# Patient Record
Sex: FEMALE | Race: BLACK OR AFRICAN AMERICAN | Employment: OTHER | URBAN - METROPOLITAN AREA
[De-identification: names, ages, dates, MRNs, and addresses within clinical notes are randomized per-mention and may not be internally consistent; named-entity substitution may affect disease eponyms.]

---

## 2017-02-07 ENCOUNTER — TELEPHONE (OUTPATIENT)
Dept: SURGERY | Age: 52
End: 2017-02-07

## 2017-03-16 ENCOUNTER — OFFICE VISIT (OUTPATIENT)
Dept: PULMONOLOGY | Age: 52
End: 2017-03-16

## 2017-03-16 VITALS
BODY MASS INDEX: 45.99 KG/M2 | SYSTOLIC BLOOD PRESSURE: 110 MMHG | HEIGHT: 67 IN | TEMPERATURE: 97.6 F | RESPIRATION RATE: 20 BRPM | WEIGHT: 293 LBS | OXYGEN SATURATION: 96 % | HEART RATE: 76 BPM | DIASTOLIC BLOOD PRESSURE: 60 MMHG

## 2017-03-16 DIAGNOSIS — Z91.09 ENVIRONMENTAL ALLERGIES: ICD-10-CM

## 2017-03-16 DIAGNOSIS — J45.40 MODERATE PERSISTENT ASTHMA WITHOUT COMPLICATION: Primary | ICD-10-CM

## 2017-03-16 PROCEDURE — 99213 OFFICE O/P EST LOW 20 MIN: CPT | Performed by: INTERNAL MEDICINE

## 2017-03-16 RX ORDER — FLUTICASONE FUROATE AND VILANTEROL 200; 25 UG/1; UG/1
200 POWDER RESPIRATORY (INHALATION) DAILY
Qty: 1 EACH | Refills: 5 | Status: SHIPPED | OUTPATIENT
Start: 2017-03-16 | End: 2018-03-22 | Stop reason: SDUPTHER

## 2017-03-16 ASSESSMENT — ASTHMA QUESTIONNAIRES
QUESTION_5 LAST FOUR WEEKS HOW WOULD YOU RATE YOUR ASTHMA CONTROL: 2
ACT_TOTALSCORE: 14
QUESTION_4 LAST FOUR WEEKS HOW OFTEN HAVE YOU USED YOUR RESCUE INHALER OR NEBULIZER MEDICATION (SUCH AS ALBUTEROL): 2
QUESTION_1 LAST FOUR WEEKS HOW MUCH OF THE TIME DID YOUR ASTHMA KEEP YOU FROM GETTING AS MUCH DONE AT WORK, SCHOOL OR AT HOME: 3
QUESTION_2 LAST FOUR WEEKS HOW OFTEN HAVE YOU HAD SHORTNESS OF BREATH: 3
QUESTION_3 LAST FOUR WEEKS HOW OFTEN DID YOUR ASTHMA SYMPTOMS (WHEEZING, COUGHING, SHORTNESS OF BREATH, CHEST TIGHTNESS OR PAIN) WAKE YOU UP AT NIGHT OR EARLIER THAN USUAL IN THE MORNING: 4

## 2017-07-05 ENCOUNTER — TELEPHONE (OUTPATIENT)
Dept: PULMONOLOGY | Age: 52
End: 2017-07-05

## 2017-11-14 ENCOUNTER — OFFICE VISIT (OUTPATIENT)
Dept: ORTHOPEDIC SURGERY | Age: 52
End: 2017-11-14

## 2017-11-14 VITALS
BODY MASS INDEX: 45.99 KG/M2 | WEIGHT: 293 LBS | DIASTOLIC BLOOD PRESSURE: 72 MMHG | HEIGHT: 67 IN | SYSTOLIC BLOOD PRESSURE: 155 MMHG | HEART RATE: 73 BPM

## 2017-11-14 DIAGNOSIS — M43.09 SPONDYLOLYSIS OF MULTIPLE SITES IN SPINE: ICD-10-CM

## 2017-11-14 DIAGNOSIS — M54.5 LOW BACK PAIN, UNSPECIFIED BACK PAIN LATERALITY, UNSPECIFIED CHRONICITY, WITH SCIATICA PRESENCE UNSPECIFIED: Primary | ICD-10-CM

## 2017-11-14 DIAGNOSIS — S39.012A STRAIN OF LUMBAR REGION, INITIAL ENCOUNTER: ICD-10-CM

## 2017-11-14 DIAGNOSIS — S29.019A THORACIC MYOFASCIAL STRAIN, INITIAL ENCOUNTER: ICD-10-CM

## 2017-11-14 PROCEDURE — 99214 OFFICE O/P EST MOD 30 MIN: CPT | Performed by: FAMILY MEDICINE

## 2017-11-14 RX ORDER — METHYLPREDNISOLONE 4 MG/1
TABLET ORAL
Qty: 1 KIT | Refills: 0 | Status: SHIPPED | OUTPATIENT
Start: 2017-11-14 | End: 2018-03-22

## 2017-11-14 RX ORDER — METAXALONE 800 MG/1
800 TABLET ORAL
COMMUNITY
Start: 2013-08-07

## 2017-11-14 NOTE — PATIENT INSTRUCTIONS
If you're currently taking an anti-inflammatory such as advil, aleve, ibuprofen, diclofenac, naproxen, meloxicam, celebrex, or nabumetome, please stop. Take Medrol first for 6 days. This is a steroid pack.  Flip the package over to the foil side and the directions will tell you to start with 6 pills the first day, 5 pills the second day, etc.     Once you are finished with the medrol, then you may re-start or start your anti-inflammatory: meloxicam

## 2017-11-15 NOTE — PROGRESS NOTES
Chief Complaint  Initial Consultation Back Pain (Lumbar pain/ spasms)    She'll consultation for left greater than right direct lumbar back pain with spasm    History of Present Illness:  Eliza  is a 46 y.o. female, who works as a dispatcher for [employer] who is a very nice patient of Dr. Iliana Baez who is being seen today in Consultation from Dr Iliana Baez for Evaluation of Relatively Acute Onset of Left Greater Than Right Sided Thoracolumbar Back Pain with Spasm. She states that she has been experiencing ongoing symptoms since about 11/1/2017. She does not recall any specific history of injury or new activity but she was doing frequent lifting at work which she believed contributed to this. She is complaining of soreness and stiffness rated at 4-5 out of 10 although she moved suddenly it to be much sharp radiating to 9 out of 10. This is associated with spasm. It seems to be most centered at the per thoracolumbar junction as well as the obliques. She is not complaining of radiation of pain to the gluteal region or lower extremities. She does have a history of back spasms and has been given Skelaxin in the past which she has been taking. She also has a substantial arthritic history to her knee and is status post left total knee arthroplasty. She is not having substantial night pain and has been taking her chronic meloxicam as well. Denies neurogenic bowel or bladder symptoms. She has not had recent imaging although she did see she believes Dr. Kaylyn Kang at [facility] for back pain a couple of years ago who recommended therapy. She does not typically perform her exercise program.  She is seen today for orthopedic and sports consultation. Medical History    Patient's medications, allergies, past medical, surgical, social and family histories were reviewed 11/14/2017 and updated as appropriate.     Review of Systems    Relevant review of systems reviewed 11/14/2017 and available in the patient's chart     Vital Signs  Vitals:    11/14/17 1335   BP: (!) 155/72   Pulse: 73       General Exam:     Constitutional: Patient is adequately groomed with no evidence of malnutrition  DTRs: Deep tendon reflexes are intact  Mental Status: The patient is oriented to time, place and person. The patient's mood and affect are appropriate. Lymphatic: The lymphatic examination bilaterally reveals all areas to be without enlargement or induration. Vascular: Examination reveals no swelling or calf tenderness. Peripheral pulses are palpable and 2+. Neurological: The patient has good coordination. There is no weakness or sensory deficit. Thoracolumbar examination   Inspection:  There is no high-grade deformity and no appreciable spasm or soft tissue swelling at this point. She is fairly tight with regard to thoracolumbar musculature. Palpation:  She has local tenderness over the left greater right thoracolumbar musculature as well as over the obliques. No substantial SI notch or trochanteric discomfort. She does have some lower facet discomfort. Rang of Motion:  He is able to forward flex to about 60°. Lateral bending rotation are diminished by about 25%. Some mild pain with extension which does not radiate pain into the lower extremities. Strength:  He has no evidence of focal lower extremity motor deficits. Special Tests:  Her straight leg raising does appear to be fairly benign. Negative logroll testing involving the hips. Negative screening cervical testing. Skin: There are no rashes, ulcerations or lesions. Distal motor sensory and vascular exam is intact. Gait: Reasonably fluid gait with mild pain getting up from a seated position. Reflex symmetrically preserved.     Additional Comments:       Additional Examinations:         Right Lower Extremity: Examination of the right lower extremity does not show any tenderness, deformity can take periodically for spasm. We will start her in physical therapy. Light duty work restrictions limiting repetitive lifting to less than 20 pounds over the next several weeks as recommended. I will see her back in 3-4 weeks for follow-up.

## 2018-03-20 ENCOUNTER — TELEPHONE (OUTPATIENT)
Dept: PULMONOLOGY | Age: 53
End: 2018-03-20

## 2018-03-20 RX ORDER — PREDNISONE 20 MG/1
TABLET ORAL
Qty: 11 TABLET | Refills: 0 | Status: SHIPPED | OUTPATIENT
Start: 2018-03-20

## 2018-03-20 NOTE — TELEPHONE ENCOUNTER
Pt called, states she has wheezing when she walks, she stated she has been taking all meds prescribed, but still has wheezing, she scheduled appt for thurs morning to discuss more

## 2018-03-22 ENCOUNTER — OFFICE VISIT (OUTPATIENT)
Dept: PULMONOLOGY | Age: 53
End: 2018-03-22

## 2018-03-22 VITALS
OXYGEN SATURATION: 95 % | SYSTOLIC BLOOD PRESSURE: 143 MMHG | HEIGHT: 67 IN | TEMPERATURE: 97.5 F | HEART RATE: 86 BPM | DIASTOLIC BLOOD PRESSURE: 78 MMHG | BODY MASS INDEX: 45.99 KG/M2 | RESPIRATION RATE: 20 BRPM | WEIGHT: 293 LBS

## 2018-03-22 DIAGNOSIS — J20.9 ACUTE BRONCHITIS, UNSPECIFIED ORGANISM: ICD-10-CM

## 2018-03-22 DIAGNOSIS — J45.30 MILD PERSISTENT ASTHMA WITHOUT COMPLICATION: Primary | ICD-10-CM

## 2018-03-22 PROCEDURE — 99213 OFFICE O/P EST LOW 20 MIN: CPT | Performed by: INTERNAL MEDICINE

## 2018-03-22 RX ORDER — LEVOFLOXACIN 500 MG/1
500 TABLET, FILM COATED ORAL DAILY
Qty: 7 TABLET | Refills: 0 | Status: SHIPPED | OUTPATIENT
Start: 2018-03-22 | End: 2018-03-29

## 2018-03-22 RX ORDER — FLUTICASONE FUROATE AND VILANTEROL 200; 25 UG/1; UG/1
200 POWDER RESPIRATORY (INHALATION) DAILY
Qty: 1 EACH | Refills: 5 | Status: SHIPPED | OUTPATIENT
Start: 2018-03-22

## 2018-03-22 RX ORDER — PROMETHAZINE HYDROCHLORIDE AND PHENYLEPHRINE HYDROCHLORIDE 6.25; 5 MG/5ML; MG/5ML
5 SYRUP ORAL EVERY 6 HOURS
COMMUNITY

## 2018-03-22 RX ORDER — FLUTICASONE FUROATE AND VILANTEROL 200; 25 UG/1; UG/1
1 POWDER RESPIRATORY (INHALATION) DAILY
Qty: 1 EACH | Refills: 0 | COMMUNITY
Start: 2018-03-22

## 2018-03-22 RX ORDER — GUAIFENESIN 400 MG/1
400 TABLET ORAL 4 TIMES DAILY PRN
COMMUNITY

## 2018-03-22 ASSESSMENT — ASTHMA QUESTIONNAIRES
ACT_TOTALSCORE: 8
QUESTION_4 LAST FOUR WEEKS HOW OFTEN HAVE YOU USED YOUR RESCUE INHALER OR NEBULIZER MEDICATION (SUCH AS ALBUTEROL): 1
QUESTION_1 LAST FOUR WEEKS HOW MUCH OF THE TIME DID YOUR ASTHMA KEEP YOU FROM GETTING AS MUCH DONE AT WORK, SCHOOL OR AT HOME: 3
QUESTION_3 LAST FOUR WEEKS HOW OFTEN DID YOUR ASTHMA SYMPTOMS (WHEEZING, COUGHING, SHORTNESS OF BREATH, CHEST TIGHTNESS OR PAIN) WAKE YOU UP AT NIGHT OR EARLIER THAN USUAL IN THE MORNING: 1
QUESTION_2 LAST FOUR WEEKS HOW OFTEN HAVE YOU HAD SHORTNESS OF BREATH: 1
QUESTION_5 LAST FOUR WEEKS HOW WOULD YOU RATE YOUR ASTHMA CONTROL: 2

## 2018-03-22 NOTE — PROGRESS NOTES
up.    Asthma- Continue Breo 200/25 mcg daily. Continue Proventil and albuterol nebulizers as needed. Acute bronchitis- Will prescribe a seven-day course of Levaquin. She is to complete the 9 day taper of prednisone. She will return for follow-up in 3 months.       Shital Ernandez MD  [facility], [facility]

## 2020-01-07 ENCOUNTER — HOSPITAL ENCOUNTER (OUTPATIENT)
Dept: PHYSICAL THERAPY | Age: 55
Setting detail: THERAPIES SERIES
Discharge: HOME | End: 2020-01-07
Payer: COMMERCIAL

## 2020-01-07 NOTE — PROGRESS NOTES
Physical Therapy  Cancellation/No-show Note  Patient Name:  Elmer Velasco  :  1965   Date:  2020  Cancelled visits to date: 1 - initial evaluation   No-shows to date: 0    For today's appointment patient:  [x]  Cancelled  []  Rescheduled appointment  []  No-show     Reason given by patient:  []  Patient ill  []  Conflicting appointment  []  No transportation    []  Conflict with work  []  No reason given  [x]  Other:   Patient is going to go to [facility] was told by [facility]  that all her paperwork was sent there and it would be too much work to send it to us     Comments:      Electronically signed by:  Abel Lackey, [id number] [address]